# Patient Record
Sex: MALE | Race: WHITE | NOT HISPANIC OR LATINO | URBAN - METROPOLITAN AREA
[De-identification: names, ages, dates, MRNs, and addresses within clinical notes are randomized per-mention and may not be internally consistent; named-entity substitution may affect disease eponyms.]

---

## 2018-07-16 ENCOUNTER — OFFICE VISIT (OUTPATIENT)
Dept: FAMILY MEDICINE CLINIC | Facility: CLINIC | Age: 28
End: 2018-07-16
Payer: COMMERCIAL

## 2018-07-16 VITALS
DIASTOLIC BLOOD PRESSURE: 68 MMHG | WEIGHT: 212 LBS | RESPIRATION RATE: 16 BRPM | BODY MASS INDEX: 27.21 KG/M2 | HEART RATE: 72 BPM | HEIGHT: 74 IN | SYSTOLIC BLOOD PRESSURE: 100 MMHG

## 2018-07-16 DIAGNOSIS — B02.9 HERPES ZOSTER WITHOUT COMPLICATION: Primary | ICD-10-CM

## 2018-07-16 PROCEDURE — 99203 OFFICE O/P NEW LOW 30 MIN: CPT | Performed by: NURSE PRACTITIONER

## 2018-07-16 PROCEDURE — 3008F BODY MASS INDEX DOCD: CPT | Performed by: NURSE PRACTITIONER

## 2018-07-16 PROCEDURE — 1036F TOBACCO NON-USER: CPT | Performed by: NURSE PRACTITIONER

## 2018-07-16 NOTE — PROGRESS NOTES
Assessment/Plan:    1  Herpes zoster without complication  We discussed transmission  Offered valtrex but as this is over 1 week in development and is starting to crust over pt defers  Will call with  Questions  Will get old records transferred  Subjective:      Patient ID: Yi Martin is a 32 y o  male  Here today to establish as a new pt  Has not been to a doctor in many years as he has been very healthy  Appendectomy at age 10 years  No meds  Here today because of poisen ivy  Was working in the lawn and started with the rash on his back and chest  Was itchy at fist but is now painful   Has not treated this at all  Has some increase in stress in his life  Had chicken pox as  A child  OBJECTIVE  History reviewed  No pertinent past medical history  @[facility]    Wt Readings from Last 3 Encounters:   07/16/18 96 2 kg (212 lb)     BP Readings from Last 3 Encounters:   07/16/18 100/68     Pulse Readings from Last 3 Encounters:   07/16/18 72     BMI Readings from Last 3 Encounters:   07/16/18 27 59 kg/m²        Physical Exam   Constitutional: He appears well-developed and well-nourished  Skin:   R back and R chest wall with clustered erythematous drying, crusting and vesicular lesions

## 2020-03-26 ENCOUNTER — TELEMEDICINE (OUTPATIENT)
Dept: FAMILY MEDICINE CLINIC | Facility: CLINIC | Age: 30
End: 2020-03-26
Payer: COMMERCIAL

## 2020-03-26 DIAGNOSIS — J02.9 SORE THROAT: Primary | ICD-10-CM

## 2020-03-26 PROCEDURE — 99213 OFFICE O/P EST LOW 20 MIN: CPT | Performed by: FAMILY MEDICINE

## 2020-03-26 PROCEDURE — 1036F TOBACCO NON-USER: CPT | Performed by: FAMILY MEDICINE

## 2020-03-26 NOTE — PATIENT INSTRUCTIONS
1  Keep   Yourself in the house / apartment for 14 days  2  Remember that the treatment is supportive care  Tylenol for fevers, 650 mg 2 together up to 3 times a day  Avoid any anti-inflammatories such as Advil, Motrin, ibuprofen, Aleve    3   If you start getting chest tightness, please call us back    If you start feeling very feverish with chest tightness, please call us back

## 2020-03-26 NOTE — PROGRESS NOTES
COVID-19 Virtual Visit       Patient requested a note regarding the 14 days of quarantined to be sent to [email or web address]    This virtual check-in was done via [employer] and patient was informed that this is not a secure, HIPAA-complaint platform  he agrees to proceed     Encounter provider Kalen Araiza DO    Provider located at [place] [address]  [address]  [facility] [address] [facility]  [phone]    Recent Visits  No visits were found meeting these conditions  Showing recent visits within past 7 days and meeting all other requirements     Today's Visits  Date Type Provider Dept   03/26/20 Telemedicine Kalen Araiza DO [address]   Showing today's visits and meeting all other requirements     Future Appointments  Date Type Provider Dept   03/26/20 Telemedicine Kalen Araiza DO [address] [facility]   Showing future appointments within next 150 days and meeting all other requirements        Patient agrees to participate in a virtual check in via telephone or video visit instead of presenting to the office to address urgent/immediate medical needs  Patient is aware this is a billable service  After connecting through [employer], the patient was identified by name and date of birth  Donniejarod Nguyen was informed that this was a telemedicine visit and that the exam was being conducted confidentially over secure lines  My office door was closed  No one else was in the room  Donnie Nguyen acknowledged consent and understanding of privacy and security of the telemedicine visit  I informed the patient that I have reviewed his record in Epic and presented the opportunity for him to ask any questions regarding the visit today  The patient agreed to participate  Russelljarod Nguyen is a 30 y o  male who is concerned about COVID-19      He reports  sore throat chest might be tight exposure to someone 3 days ago who tested positive today  [place] He has not traveled outside the [place]  within the last 14 days    He has had contact with a person who is under investigation for or who is positive for COVID-19 within the last 14 days  He has not been hospitalized recently for fever and/or lower respiratory symptoms  History reviewed  No pertinent past medical history  History reviewed  No pertinent surgical history  No current outpatient medications on file  No current facility-administered medications for this visit  No Known Allergies    Video Exam    Luciano Elks appears healthy  Disposition:      I recommended self-quarantine for 14 days and to call back for worsening symptoms or development of shortness of breath  I spent 15 minutes with the patient during this virtual check-in visit

## 2022-08-16 ENCOUNTER — TELEMEDICINE (OUTPATIENT)
Dept: FAMILY MEDICINE CLINIC | Facility: CLINIC | Age: 32
End: 2022-08-16
Payer: COMMERCIAL

## 2022-08-16 VITALS — WEIGHT: 259 LBS | BODY MASS INDEX: 33.71 KG/M2 | TEMPERATURE: 98.1 F

## 2022-08-16 DIAGNOSIS — R05.9 COUGH: ICD-10-CM

## 2022-08-16 DIAGNOSIS — U07.1 COVID-19: Primary | ICD-10-CM

## 2022-08-16 PROCEDURE — 99213 OFFICE O/P EST LOW 20 MIN: CPT | Performed by: FAMILY MEDICINE

## 2022-08-16 PROCEDURE — 3725F SCREEN DEPRESSION PERFORMED: CPT | Performed by: FAMILY MEDICINE

## 2022-08-16 RX ORDER — PREDNISONE 20 MG/1
20 TABLET ORAL 2 TIMES DAILY WITH MEALS
Qty: 10 TABLET | Refills: 0 | Status: SHIPPED | OUTPATIENT
Start: 2022-08-16

## 2022-08-16 RX ORDER — DIPHENOXYLATE HYDROCHLORIDE AND ATROPINE SULFATE 2.5; .025 MG/1; MG/1
1 TABLET ORAL DAILY
COMMUNITY

## 2022-08-16 NOTE — PROGRESS NOTES
COVID-19 Outpatient Progress Note    Assessment/Plan:    1  COVID-19  - predniSONE 20 mg tablet; Take 1 tablet (20 mg total) by mouth 2 (two) times a day with meals  Dispense: 10 tablet; Refill: 0    2  Cough  - predniSONE 20 mg tablet; Take 1 tablet (20 mg total) by mouth 2 (two) times a day with meals  Dispense: 10 tablet; Refill: 0     Disposition:     Patient has COVID-19 infection  Based off CDC guidelines, they were recommended to isolate for 5 days from the date of the positive test  If they remain asymptomatic, isolation may be ended followed by 5 days of wearing a mask when around othes to minimize risk of infecting others  If they have a fever, continue to stay home until fever resolves for at least 24 hours  Advised rest, plenty of fluids, Tylenol as needed for body aches or fever, Mucinex as needed for cough/ congestion, start prednisone 20 mg twice a day for 5 days, also discussed Albuterol inhaler as needed for chest tightness, however patient declined at this time  Patient was encouraged to call the office for worsening symptoms or development of shortness of breath or go to ER  Patient understands and agrees with the treatment plan  I have spent 15 minutes directly with the patient  Greater than 50% of this time was spent in counseling/coordination of care regarding: risks and benefits of treatment options, instructions for management and patient and family education  Encounter provider Yvonne Caal MD    Provider located at [address] [place] [address]  [phone]    Recent Visits  No visits were found meeting these conditions    Showing recent visits within past 7 days and meeting all other requirements  Today's Visits  Date Type Provider Dept   08/16/22 Telemedicine Yvonne Caal MD [address] today's visits and meeting all other requirements  Future Appointments  No visits were found meeting these conditions  Showing future appointments within next 150 days and meeting all other requirements       The patient was identified by name and date of birth  Juan J Redmond was informed that this is a telemedicine visit and that the visit is being conducted through [facility] and patient was informed this is a secure, HIPAA-complaint platform  He agrees to proceed     My office door was closed  No one else was in the room  He acknowledged consent and understanding of privacy and security of the video platform  The patient has agreed to participate and understands they can discontinue the visit at any time  Patient is aware this is a billable service  This virtual check-in was done via [employer] and patient was informed that this is a secure, HIPAA-compliant platform  He agrees to proceed  Patient agrees to participate in a virtual check in via telephone or video visit instead of presenting to the office to address urgent/immediate medical needs  Patient is aware this is a billable service  After connecting through [facility], the patient was identified by name and date of birth  Juan J Redmond was informed that this was a telemedicine visit and that the exam was being conducted confidentially over secure lines  My office door was closed  No one else was in the room  Juan J Redmond acknowledged consent and understanding of privacy and security of the telemedicine visit  I informed the patient that I have reviewed his record in Epic and presented the opportunity for him to ask any questions regarding the visit today  The patient agreed to participate  Verification of patient location:  Patient is located in the following state in which I hold an active license: [place]    Subjective:   Juan J Redmond is a 27 y o  male who has been screened for COVID-19  Symptom change since last report: unchanged   Patient's symptoms include fever, chills, fatigue, nasal congestion, sore throat, cough, chest tightness, diarrhea, myalgias and headache      - Date of symptom onset: 8/11/2022  - Date of positive COVID-19 test: 8/12/2022  Type of test: Home antigen  COVID-19 vaccination status: Fully vaccinated with booster    Lab Results   Component Value Date    SARSCOV2 Negative 02/13/2021     History reviewed  No pertinent past medical history  History reviewed  No pertinent surgical history  Current Outpatient Medications   Medication Sig Dispense Refill    multivitamin (THERAGRAN) TABS Take 1 tablet by mouth daily      predniSONE 20 mg tablet Take 1 tablet (20 mg total) by mouth 2 (two) times a day with meals 10 tablet 0     No current facility-administered medications for this visit  No Known Allergies    Review of Systems   Constitutional: Positive for appetite change, chills, fatigue and fever  HENT: Positive for congestion and sore throat  Respiratory: Positive for cough and chest tightness  Negative for wheezing  Cardiovascular: Negative for chest pain  Gastrointestinal: Positive for diarrhea  Musculoskeletal: Positive for myalgias  Neurological: Positive for headaches  Negative for dizziness  Objective:    Vitals:    08/16/22 1840   Temp: 98 1 °F (36 7 °C)   Weight: 117 kg (259 lb)       Physical Exam  Constitutional:       General: He is not in acute distress  Pulmonary:      Effort: Pulmonary effort is normal       Comments: Frequent coughing during visit, no signs of respiratory distress, tachypnea, conversational dyspnea or audible wheezing noted  Neurological:      Mental Status: He is alert and oriented to person, place, and time  Psychiatric:         Mood and Affect: Mood normal          Behavior: Behavior normal          Thought Content:  Thought content normal

## 2024-03-08 ENCOUNTER — OFFICE VISIT (OUTPATIENT)
Dept: URGENT CARE | Facility: CLINIC | Age: 34
End: 2024-03-08
Payer: COMMERCIAL

## 2024-03-08 VITALS
OXYGEN SATURATION: 98 % | BODY MASS INDEX: 34.44 KG/M2 | TEMPERATURE: 100.4 F | WEIGHT: 277 LBS | DIASTOLIC BLOOD PRESSURE: 60 MMHG | SYSTOLIC BLOOD PRESSURE: 110 MMHG | HEART RATE: 95 BPM | HEIGHT: 75 IN | RESPIRATION RATE: 18 BRPM

## 2024-03-08 DIAGNOSIS — J02.9 SORE THROAT: ICD-10-CM

## 2024-03-08 DIAGNOSIS — J02.9 ACUTE PHARYNGITIS, UNSPECIFIED ETIOLOGY: Primary | ICD-10-CM

## 2024-03-08 LAB — S PYO AG THROAT QL: NEGATIVE

## 2024-03-08 PROCEDURE — 87070 CULTURE OTHR SPECIMN AEROBIC: CPT

## 2024-03-08 PROCEDURE — 87880 STREP A ASSAY W/OPTIC: CPT

## 2024-03-08 PROCEDURE — G0383 LEV 4 HOSP TYPE B ED VISIT: HCPCS

## 2024-03-08 RX ORDER — METHYLPREDNISOLONE 4 MG/1
TABLET ORAL
Qty: 21 TABLET | Refills: 0 | Status: SHIPPED | OUTPATIENT
Start: 2024-03-08

## 2024-03-08 RX ORDER — AMOXICILLIN 500 MG/1
500 CAPSULE ORAL EVERY 12 HOURS SCHEDULED
Qty: 14 CAPSULE | Refills: 0 | Status: SHIPPED | OUTPATIENT
Start: 2024-03-08 | End: 2024-03-15

## 2024-03-08 NOTE — PROGRESS NOTES
[facility]        NAME: Levar Narayanan is a 33 y.o. male  : 1990    MRN: [id number]  DATE: 2024  TIME: 9:43 AM    Assessment and Plan   Acute pharyngitis, unspecified etiology [J02.9]  1. Acute pharyngitis, unspecified etiology  methylPREDNISolone 4 MG tablet therapy pack    amoxicillin (AMOXIL) 500 mg capsule    Throat culture      2. Sore throat  POCT rapid ANTIGEN strepA        Patient Instructions     Rapid strep completed in office today. Negative rapid strep - will send for culture. Antibiotics started empirically per [facility] criteria.    Follow-up with PCP in the next 3-5 days if no improvement.   Go to the ED if symptoms severely worsen.    Chief Complaint     Chief Complaint   Patient presents with    Sore Throat     Pt presents with sore throat and fever x 1 day.       History of Present Illness     Levar Narayanan is a 33 y.o. male presenting to the office today for sore throat. Symptoms have been present for 1 days, and include fever, swollen uvula, sore throat, pain with swallowing.   He has tried chloraseptic for his symptoms, minimal relief.  Sick contacts include: none  Recent travel: none    Review of Systems     Review of Systems   Constitutional:  Positive for fever. Negative for chills.   HENT:  Positive for sore throat and trouble swallowing. Negative for congestion, ear pain and rhinorrhea.    Respiratory:  Negative for cough, shortness of breath, wheezing and stridor.    Gastrointestinal:  Negative for abdominal pain, nausea and vomiting.   Genitourinary: Negative.    Musculoskeletal:  Negative for myalgias.   Skin:  Negative for color change and rash.   Neurological:  Negative for seizures and syncope.       Current Medications       Current Outpatient Medications:     amoxicillin (AMOXIL) 500 mg capsule, Take 1 capsule (500 mg total) by mouth every 12 (twelve) hours for 7 days, Disp: 14 capsule, Rfl: 0    methylPREDNISolone 4 MG tablet therapy pack, Use as  "directed on package, Disp: 21 tablet, Rfl: 0    multivitamin (THERAGRAN) TABS, Take 1 tablet by mouth daily, Disp: , Rfl:     Current Allergies     Allergies as of 03/08/2024    (No Known Allergies)            The following portions of the patient's history were reviewed and updated as appropriate: allergies, current medications, past family history, past medical history, past social history, past surgical history and problem list.     History reviewed. No pertinent past medical history.    History reviewed. No pertinent surgical history.    Family History   Problem Relation Age of Onset    Diabetes Mother     Hypertension Father     No Known Problems Sister     No Known Problems Brother     Substance Abuse Neg Hx     Mental illness Neg Hx     Alcohol abuse Neg Hx        Medications have been verified.    Objective     /60   Pulse 95   Temp 100.4 °F (38 °C)   Resp 18   Ht 6' 3\" (1.905 m)   Wt 126 kg (277 lb)   SpO2 98%   BMI 34.62 kg/m²   No LMP for male patient.     Physical Exam     Physical Exam  Vitals and nursing note reviewed.   Constitutional:       General: He is not in acute distress.     Appearance: He is well-developed and normal weight. He is not ill-appearing, toxic-appearing or diaphoretic.   HENT:      Head: Normocephalic and atraumatic.      Right Ear: Tympanic membrane and ear canal normal. No drainage, swelling or tenderness. No middle ear effusion. Tympanic membrane is not erythematous.      Left Ear: Tympanic membrane and ear canal normal. No drainage, swelling or tenderness.  No middle ear effusion. Tympanic membrane is not erythematous.      Nose: Congestion present. No rhinorrhea.      Mouth/Throat:      Mouth: Mucous membranes are moist. No oral lesions.      Pharynx: Oropharynx is clear. Uvula midline. Posterior oropharyngeal erythema and uvula swelling present. No pharyngeal swelling or oropharyngeal exudate.      Tonsils: No tonsillar exudate or tonsillar abscesses. 1+ on the " right. 1+ on the left.   Eyes:      Conjunctiva/sclera: Conjunctivae normal.   Neck:      Thyroid: No thyromegaly.   Cardiovascular:      Rate and Rhythm: Normal rate and regular rhythm.      Heart sounds: Normal heart sounds. No murmur heard.     No friction rub. No gallop.   Pulmonary:      Effort: Pulmonary effort is normal. No respiratory distress.      Breath sounds: Normal breath sounds. No stridor. No wheezing, rhonchi or rales.   Chest:      Chest wall: No tenderness.   Musculoskeletal:      Cervical back: Normal range of motion and neck supple.   Lymphadenopathy:      Cervical: No cervical adenopathy.   Skin:     General: Skin is warm and dry.      Capillary Refill: Capillary refill takes less than 2 seconds.   Neurological:      General: No focal deficit present.      Mental Status: He is alert and oriented to person, place, and time.   Psychiatric:         Mood and Affect: Mood normal.         Behavior: Behavior normal.

## 2024-03-10 LAB — BACTERIA THROAT CULT: NORMAL
